# Patient Record
Sex: MALE | Race: BLACK OR AFRICAN AMERICAN
[De-identification: names, ages, dates, MRNs, and addresses within clinical notes are randomized per-mention and may not be internally consistent; named-entity substitution may affect disease eponyms.]

---

## 2019-09-14 ENCOUNTER — HOSPITAL ENCOUNTER (EMERGENCY)
Dept: HOSPITAL 94 - ER | Age: 29
Discharge: HOME | End: 2019-09-14
Payer: MEDICAID

## 2019-09-14 VITALS — WEIGHT: 240.3 LBS | HEIGHT: 73 IN | BODY MASS INDEX: 31.85 KG/M2

## 2019-09-14 VITALS — SYSTOLIC BLOOD PRESSURE: 181 MMHG | DIASTOLIC BLOOD PRESSURE: 126 MMHG

## 2019-09-14 DIAGNOSIS — B07.9: Primary | ICD-10-CM

## 2019-09-14 DIAGNOSIS — Z79.899: ICD-10-CM

## 2019-09-14 DIAGNOSIS — I10: ICD-10-CM

## 2019-09-14 PROCEDURE — 99284 EMERGENCY DEPT VISIT MOD MDM: CPT

## 2019-09-14 PROCEDURE — 11440 EXC FACE-MM B9+MARG 0.5 CM/<: CPT

## 2020-05-03 ENCOUNTER — HOSPITAL ENCOUNTER (EMERGENCY)
Dept: HOSPITAL 94 - ER | Age: 30
Discharge: HOME | End: 2020-05-03
Payer: MEDICAID

## 2020-05-03 VITALS — DIASTOLIC BLOOD PRESSURE: 128 MMHG | SYSTOLIC BLOOD PRESSURE: 175 MMHG

## 2020-05-03 VITALS — BODY MASS INDEX: 31.87 KG/M2 | WEIGHT: 240.5 LBS | HEIGHT: 73 IN

## 2020-05-03 DIAGNOSIS — F17.210: ICD-10-CM

## 2020-05-03 DIAGNOSIS — I10: ICD-10-CM

## 2020-05-03 DIAGNOSIS — Z79.899: ICD-10-CM

## 2020-05-03 DIAGNOSIS — J45.909: Primary | ICD-10-CM

## 2020-05-03 PROCEDURE — 94640 AIRWAY INHALATION TREATMENT: CPT

## 2020-05-03 PROCEDURE — 99406 BEHAV CHNG SMOKING 3-10 MIN: CPT

## 2020-05-03 PROCEDURE — 94760 N-INVAS EAR/PLS OXIMETRY 1: CPT

## 2020-05-03 PROCEDURE — 99283 EMERGENCY DEPT VISIT LOW MDM: CPT

## 2020-05-20 ENCOUNTER — HOSPITAL ENCOUNTER (INPATIENT)
Dept: HOSPITAL 94 - ER | Age: 30
LOS: 3 days | Discharge: HOME | DRG: 194 | End: 2020-05-23
Attending: INTERNAL MEDICINE | Admitting: SPECIALIST
Payer: MEDICAID

## 2020-05-20 VITALS — HEIGHT: 73 IN | WEIGHT: 200.42 LBS | BODY MASS INDEX: 26.56 KG/M2

## 2020-05-20 DIAGNOSIS — I11.0: Primary | ICD-10-CM
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
## 2020-05-20 DIAGNOSIS — Z82.49: ICD-10-CM

## 2020-05-20 DIAGNOSIS — I42.9: ICD-10-CM

## 2020-05-20 DIAGNOSIS — F14.90: ICD-10-CM

## 2020-05-20 DIAGNOSIS — N17.9: ICD-10-CM

## 2020-05-20 DIAGNOSIS — I42.7: ICD-10-CM

## 2020-05-20 DIAGNOSIS — I16.1: ICD-10-CM

## 2020-05-20 DIAGNOSIS — T50.905A: ICD-10-CM

## 2020-05-20 DIAGNOSIS — F17.200: ICD-10-CM

## 2020-05-20 DIAGNOSIS — I50.23: ICD-10-CM

## 2020-05-20 DIAGNOSIS — J45.909: ICD-10-CM

## 2020-05-20 DIAGNOSIS — Z03.818: ICD-10-CM

## 2020-05-20 LAB
BASOPHILS # BLD AUTO: 0 X10'3 (ref 0–0.2)
BASOPHILS NFR BLD AUTO: 0.4 % (ref 0–1)
D DIMER PPP FEU-MCNC: 0.99 MG/L FEU (ref 0–0.5)
EOSINOPHIL # BLD AUTO: 0 X10'3 (ref 0–0.9)
EOSINOPHIL NFR BLD AUTO: 0.4 % (ref 0–6)
ERYTHROCYTE [DISTWIDTH] IN BLOOD BY AUTOMATED COUNT: 17.3 % (ref 11.5–14.5)
HCT VFR BLD AUTO: 39.9 % (ref 42–52)
HGB BLD-MCNC: 12.8 G/DL (ref 14–17.9)
LYMPHOCYTES # BLD AUTO: 1 X10'3 (ref 1.1–4.8)
LYMPHOCYTES NFR BLD AUTO: 7.6 % (ref 21–51)
MCH RBC QN AUTO: 28.9 PG (ref 27–31)
MCHC RBC AUTO-ENTMCNC: 32 G/DL (ref 33–36.5)
MCV RBC AUTO: 90.4 FL (ref 78–98)
MONOCYTES # BLD AUTO: 1.1 X10'3 (ref 0–0.9)
MONOCYTES NFR BLD AUTO: 8.5 % (ref 2–12)
NEUTROPHILS # BLD AUTO: 10.4 X10'3 (ref 1.8–7.7)
NEUTROPHILS NFR BLD AUTO: 83.1 % (ref 42–75)
PLATELET # BLD AUTO: 291 X10'3 (ref 140–440)
PMV BLD AUTO: 8.2 FL (ref 7.4–10.4)
RBC # BLD AUTO: 4.41 X10'6 (ref 4.7–6.1)
WBC # BLD AUTO: 12.5 X10'3 (ref 4.5–11)

## 2020-05-20 PROCEDURE — 96361 HYDRATE IV INFUSION ADD-ON: CPT

## 2020-05-20 PROCEDURE — 36415 COLL VENOUS BLD VENIPUNCTURE: CPT

## 2020-05-20 PROCEDURE — 87635 SARS-COV-2 COVID-19 AMP PRB: CPT

## 2020-05-20 PROCEDURE — 84484 ASSAY OF TROPONIN QUANT: CPT

## 2020-05-20 PROCEDURE — 71275 CT ANGIOGRAPHY CHEST: CPT

## 2020-05-20 PROCEDURE — 87040 BLOOD CULTURE FOR BACTERIA: CPT

## 2020-05-20 PROCEDURE — 83880 ASSAY OF NATRIURETIC PEPTIDE: CPT

## 2020-05-20 PROCEDURE — 87081 CULTURE SCREEN ONLY: CPT

## 2020-05-20 PROCEDURE — 84145 PROCALCITONIN (PCT): CPT

## 2020-05-20 PROCEDURE — 93005 ELECTROCARDIOGRAM TRACING: CPT

## 2020-05-20 PROCEDURE — 71045 X-RAY EXAM CHEST 1 VIEW: CPT

## 2020-05-20 PROCEDURE — 96365 THER/PROPH/DIAG IV INF INIT: CPT

## 2020-05-20 PROCEDURE — 83605 ASSAY OF LACTIC ACID: CPT

## 2020-05-20 PROCEDURE — 85379 FIBRIN DEGRADATION QUANT: CPT

## 2020-05-20 PROCEDURE — 80305 DRUG TEST PRSMV DIR OPT OBS: CPT

## 2020-05-20 PROCEDURE — 93306 TTE W/DOPPLER COMPLETE: CPT

## 2020-05-20 PROCEDURE — 96376 TX/PRO/DX INJ SAME DRUG ADON: CPT

## 2020-05-20 PROCEDURE — 96375 TX/PRO/DX INJ NEW DRUG ADDON: CPT

## 2020-05-20 PROCEDURE — 99285 EMERGENCY DEPT VISIT HI MDM: CPT

## 2020-05-20 PROCEDURE — 82948 REAGENT STRIP/BLOOD GLUCOSE: CPT

## 2020-05-20 PROCEDURE — 80053 COMPREHEN METABOLIC PANEL: CPT

## 2020-05-20 PROCEDURE — 85025 COMPLETE CBC W/AUTO DIFF WBC: CPT

## 2020-05-21 VITALS — DIASTOLIC BLOOD PRESSURE: 96 MMHG | SYSTOLIC BLOOD PRESSURE: 153 MMHG

## 2020-05-21 VITALS — DIASTOLIC BLOOD PRESSURE: 109 MMHG | SYSTOLIC BLOOD PRESSURE: 159 MMHG

## 2020-05-21 VITALS — SYSTOLIC BLOOD PRESSURE: 149 MMHG | DIASTOLIC BLOOD PRESSURE: 99 MMHG

## 2020-05-21 VITALS — SYSTOLIC BLOOD PRESSURE: 131 MMHG | DIASTOLIC BLOOD PRESSURE: 95 MMHG

## 2020-05-21 VITALS — DIASTOLIC BLOOD PRESSURE: 101 MMHG | SYSTOLIC BLOOD PRESSURE: 148 MMHG

## 2020-05-21 LAB
ALBUMIN SERPL BCP-MCNC: 2.5 G/DL (ref 3.4–5)
ALBUMIN/GLOB SERPL: 0.7 {RATIO} (ref 1.1–1.5)
ALP SERPL-CCNC: 74 IU/L (ref 46–116)
ALT SERPL W P-5'-P-CCNC: 63 U/L (ref 12–78)
AMPHETAMINES UR QL SCN: NEGATIVE
ANION GAP SERPL CALCULATED.3IONS-SCNC: 12 MMOL/L (ref 8–16)
AST SERPL W P-5'-P-CCNC: 47 U/L (ref 10–37)
BARBITURATES UR QL SCN: NEGATIVE
BENZODIAZ UR QL SCN: NEGATIVE
BILIRUB SERPL-MCNC: 1.1 MG/DL (ref 0.1–1)
BUN SERPL-MCNC: 20 MG/DL (ref 7–18)
BUN/CREAT SERPL: 12.3 (ref 5.4–32)
BZE UR QL SCN: POSITIVE
CALCIUM SERPL-MCNC: 8.7 MG/DL (ref 8.5–10.1)
CANNABINOIDS UR QL SCN: NEGATIVE
CHLORIDE SERPL-SCNC: 104 MMOL/L (ref 99–107)
CO2 SERPL-SCNC: 23.2 MMOL/L (ref 24–32)
CREAT SERPL-MCNC: 1.63 MG/DL (ref 0.6–1.1)
GFR SERPL CREATININE-BSD FRML MDRD: 60 ML/MIN
GLUCOSE SERPL-MCNC: 107 MG/DL (ref 70–104)
METHADONE UR QL SCN: NEGATIVE
OPIATES UR QL SCN: NEGATIVE
PCP UR QL SCN: NEGATIVE
POTASSIUM SERPL-SCNC: 4.2 MMOL/L (ref 3.5–5.1)
PROT SERPL-MCNC: 6 G/DL (ref 6.4–8.2)
SODIUM SERPL-SCNC: 139 MMOL/L (ref 135–145)

## 2020-05-21 PROCEDURE — B32T1ZZ COMPUTERIZED TOMOGRAPHY (CT SCAN) OF LEFT PULMONARY ARTERY USING LOW OSMOLAR CONTRAST: ICD-10-PCS

## 2020-05-21 PROCEDURE — B3201ZZ COMPUTERIZED TOMOGRAPHY (CT SCAN) OF THORACIC AORTA USING LOW OSMOLAR CONTRAST: ICD-10-PCS

## 2020-05-21 PROCEDURE — B32S1ZZ COMPUTERIZED TOMOGRAPHY (CT SCAN) OF RIGHT PULMONARY ARTERY USING LOW OSMOLAR CONTRAST: ICD-10-PCS

## 2020-05-21 RX ADMIN — CEFTRIAXONE SCH MLS/HR: 1 INJECTION, SOLUTION INTRAVENOUS at 09:10

## 2020-05-21 RX ADMIN — HEPARIN SODIUM SCH UNIT: 5000 INJECTION, SOLUTION INTRAVENOUS; SUBCUTANEOUS at 09:12

## 2020-05-21 RX ADMIN — HYDROCODONE BITARTRATE AND ACETAMINOPHEN PRN TAB: 5; 325 TABLET ORAL at 23:23

## 2020-05-21 RX ADMIN — HEPARIN SODIUM SCH UNIT: 5000 INJECTION, SOLUTION INTRAVENOUS; SUBCUTANEOUS at 19:49

## 2020-05-21 RX ADMIN — Medication SCH MMU: at 19:49

## 2020-05-21 NOTE — NUR
Orientee documentation:

I have reviewed and agree with all interventions, assessments performed and documented by 
OSCAR Childress.

## 2020-05-21 NOTE — NUR
Patient in room PCU 3026. I have received report from Fozia PECK RN   and had the opportunity 
to ask questions and assume patient care.

## 2020-05-21 NOTE — NUR
Orientee Medication Administration:

For this medication-pass time frame, all medication were reviewed, dispensed, administered 
and documented per hospital policy by OSCAR Childress.

## 2020-05-21 NOTE — NUR
Problems reprioritized. Patient report given, questions answered & plan of care reviewed 
with OSCAR Suarez. Patient watching tv, eating dinner independently. All needs met at this 
time.

## 2020-05-21 NOTE — NUR
Patient arrived from ER via wheelchair Alert and orientated, call light is within reach. 
Patient is up ad virgen, no signs of distress. Patients vitals temp 98, bp 148/107, O2 room air 
96%, , RR 18, pain 0/10. MRSA swab sample collected. Two RN skin check completed with 
no wounds noted.

## 2020-05-22 VITALS — DIASTOLIC BLOOD PRESSURE: 97 MMHG | SYSTOLIC BLOOD PRESSURE: 144 MMHG

## 2020-05-22 VITALS — DIASTOLIC BLOOD PRESSURE: 101 MMHG | SYSTOLIC BLOOD PRESSURE: 124 MMHG

## 2020-05-22 VITALS — SYSTOLIC BLOOD PRESSURE: 140 MMHG | DIASTOLIC BLOOD PRESSURE: 82 MMHG

## 2020-05-22 VITALS — SYSTOLIC BLOOD PRESSURE: 162 MMHG | DIASTOLIC BLOOD PRESSURE: 112 MMHG

## 2020-05-22 VITALS — SYSTOLIC BLOOD PRESSURE: 153 MMHG | DIASTOLIC BLOOD PRESSURE: 106 MMHG

## 2020-05-22 VITALS — SYSTOLIC BLOOD PRESSURE: 144 MMHG | DIASTOLIC BLOOD PRESSURE: 111 MMHG

## 2020-05-22 LAB
ALBUMIN SERPL BCP-MCNC: 2.2 G/DL (ref 3.4–5)
ALBUMIN/GLOB SERPL: 0.6 {RATIO} (ref 1.1–1.5)
ALP SERPL-CCNC: 71 IU/L (ref 46–116)
ALT SERPL W P-5'-P-CCNC: 67 U/L (ref 12–78)
ANION GAP SERPL CALCULATED.3IONS-SCNC: 10 MMOL/L (ref 8–16)
AST SERPL W P-5'-P-CCNC: 45 U/L (ref 10–37)
BASOPHILS # BLD AUTO: 0.1 X10'3 (ref 0–0.2)
BASOPHILS NFR BLD AUTO: 0.8 % (ref 0–1)
BILIRUB SERPL-MCNC: 0.9 MG/DL (ref 0.1–1)
BUN SERPL-MCNC: 24 MG/DL (ref 7–18)
BUN/CREAT SERPL: 16.8 (ref 5.4–32)
CALCIUM SERPL-MCNC: 8 MG/DL (ref 8.5–10.1)
CHLORIDE SERPL-SCNC: 104 MMOL/L (ref 99–107)
CO2 SERPL-SCNC: 22.3 MMOL/L (ref 24–32)
CREAT SERPL-MCNC: 1.43 MG/DL (ref 0.6–1.1)
EOSINOPHIL # BLD AUTO: 0.2 X10'3 (ref 0–0.9)
EOSINOPHIL NFR BLD AUTO: 1.6 % (ref 0–6)
ERYTHROCYTE [DISTWIDTH] IN BLOOD BY AUTOMATED COUNT: 17.4 % (ref 11.5–14.5)
GFR SERPL CREATININE-BSD FRML MDRD: 69 ML/MIN
GLUCOSE SERPL-MCNC: 98 MG/DL (ref 70–104)
HCT VFR BLD AUTO: 40.8 % (ref 42–52)
HGB BLD-MCNC: 13.2 G/DL (ref 14–17.9)
LYMPHOCYTES # BLD AUTO: 2 X10'3 (ref 1.1–4.8)
LYMPHOCYTES NFR BLD AUTO: 20.5 % (ref 21–51)
MCH RBC QN AUTO: 29.6 PG (ref 27–31)
MCHC RBC AUTO-ENTMCNC: 32.4 G/DL (ref 33–36.5)
MCV RBC AUTO: 91.3 FL (ref 78–98)
MONOCYTES # BLD AUTO: 1.1 X10'3 (ref 0–0.9)
MONOCYTES NFR BLD AUTO: 11.4 % (ref 2–12)
NEUTROPHILS # BLD AUTO: 6.3 X10'3 (ref 1.8–7.7)
NEUTROPHILS NFR BLD AUTO: 65.7 % (ref 42–75)
PLATELET # BLD AUTO: 290 X10'3 (ref 140–440)
PMV BLD AUTO: 8.9 FL (ref 7.4–10.4)
POTASSIUM SERPL-SCNC: 4.2 MMOL/L (ref 3.5–5.1)
PROT SERPL-MCNC: 5.8 G/DL (ref 6.4–8.2)
RBC # BLD AUTO: 4.47 X10'6 (ref 4.7–6.1)
SODIUM SERPL-SCNC: 136 MMOL/L (ref 135–145)
TROPONIN I SERPL-MCNC: 0.04 NG/ML (ref 0–0.05)
WBC # BLD AUTO: 9.6 X10'3 (ref 4.5–11)

## 2020-05-22 RX ADMIN — Medication SCH MMU: at 20:56

## 2020-05-22 RX ADMIN — HYDROCODONE BITARTRATE AND ACETAMINOPHEN PRN TAB: 5; 325 TABLET ORAL at 20:55

## 2020-05-22 RX ADMIN — HYDROCODONE BITARTRATE AND ACETAMINOPHEN PRN TAB: 5; 325 TABLET ORAL at 08:38

## 2020-05-22 RX ADMIN — CEFTRIAXONE SCH MLS/HR: 1 INJECTION, SOLUTION INTRAVENOUS at 08:38

## 2020-05-22 RX ADMIN — Medication SCH MMU: at 08:38

## 2020-05-22 RX ADMIN — HEPARIN SODIUM SCH UNIT: 5000 INJECTION, SOLUTION INTRAVENOUS; SUBCUTANEOUS at 08:37

## 2020-05-22 RX ADMIN — HEPARIN SODIUM SCH UNIT: 5000 INJECTION, SOLUTION INTRAVENOUS; SUBCUTANEOUS at 20:51

## 2020-05-22 NOTE — NUR
Merle Hernandez. [id number]. Patient would like to be discharged but wants to get meds. He says 
there is issues at home he needs to handle with his wife and landlord. Zack saw him and 
agreed to medical management. Please advise. Clemente [phone]

## 2020-05-22 NOTE — NUR
Problems reprioritized. Patient report given, questions answered & plan of care reviewed 
with Cristina MOORE.

## 2020-05-22 NOTE — NUR
Paged Dr. Alcala. Merle Hernandez. [id number]. After speaking more about the importance of him 
getting his prescriptions, he is reluctantly willing to stay. TY. Clemente [phone]

## 2020-05-22 NOTE — NUR
Paged Dr. Alcala. Merle Hernandez. [id number]. Patient would like to be discharged but wants to 
get meds. He says there is issues at home he needs to handle with his wife and landlord. 
Zack saw him and agreed to medical management. Please advise. Clemente [phone]

## 2020-05-22 NOTE — NUR
Patient in room PCU 3026. I have received report from  MEJIA MOORE  and had the opportunity 
to ask questions and assume patient care.

## 2020-05-22 NOTE — NUR
Patient in room PCU 3026. I have received report from Daniela MOORE and had the opportunity to 
ask questions and assume patient care.

## 2020-05-22 NOTE — NUR
Problems reprioritized. Patient report given, questions answered & plan of care reviewed 
with Clemente MOORE.

## 2020-05-23 VITALS — DIASTOLIC BLOOD PRESSURE: 109 MMHG | SYSTOLIC BLOOD PRESSURE: 144 MMHG

## 2020-05-23 VITALS — DIASTOLIC BLOOD PRESSURE: 81 MMHG | SYSTOLIC BLOOD PRESSURE: 121 MMHG

## 2020-05-23 VITALS — DIASTOLIC BLOOD PRESSURE: 115 MMHG | SYSTOLIC BLOOD PRESSURE: 145 MMHG

## 2020-05-23 LAB
ALBUMIN SERPL BCP-MCNC: 2.1 G/DL (ref 3.4–5)
ALBUMIN/GLOB SERPL: 0.6 {RATIO} (ref 1.1–1.5)
ALP SERPL-CCNC: 64 IU/L (ref 46–116)
ALT SERPL W P-5'-P-CCNC: 64 U/L (ref 12–78)
ANION GAP SERPL CALCULATED.3IONS-SCNC: 5 MMOL/L (ref 8–16)
AST SERPL W P-5'-P-CCNC: 38 U/L (ref 10–37)
BASOPHILS # BLD AUTO: 0.1 X10'3 (ref 0–0.2)
BASOPHILS NFR BLD AUTO: 1.1 % (ref 0–1)
BILIRUB SERPL-MCNC: 0.5 MG/DL (ref 0.1–1)
BUN SERPL-MCNC: 20 MG/DL (ref 7–18)
BUN/CREAT SERPL: 12.7 (ref 5.4–32)
CALCIUM SERPL-MCNC: 8.3 MG/DL (ref 8.5–10.1)
CHLORIDE SERPL-SCNC: 104 MMOL/L (ref 99–107)
CO2 SERPL-SCNC: 27.6 MMOL/L (ref 24–32)
CREAT SERPL-MCNC: 1.58 MG/DL (ref 0.6–1.1)
EOSINOPHIL # BLD AUTO: 0.1 X10'3 (ref 0–0.9)
EOSINOPHIL NFR BLD AUTO: 1.4 % (ref 0–6)
ERYTHROCYTE [DISTWIDTH] IN BLOOD BY AUTOMATED COUNT: 17.2 % (ref 11.5–14.5)
GFR SERPL CREATININE-BSD FRML MDRD: 62 ML/MIN
GLUCOSE SERPL-MCNC: 101 MG/DL (ref 70–104)
HCT VFR BLD AUTO: 38.1 % (ref 42–52)
HGB BLD-MCNC: 12.2 G/DL (ref 14–17.9)
LYMPHOCYTES # BLD AUTO: 1.6 X10'3 (ref 1.1–4.8)
LYMPHOCYTES NFR BLD AUTO: 20.9 % (ref 21–51)
MCH RBC QN AUTO: 29.4 PG (ref 27–31)
MCHC RBC AUTO-ENTMCNC: 32.2 G/DL (ref 33–36.5)
MCV RBC AUTO: 91.5 FL (ref 78–98)
MONOCYTES # BLD AUTO: 0.9 X10'3 (ref 0–0.9)
MONOCYTES NFR BLD AUTO: 11.9 % (ref 2–12)
NEUTROPHILS # BLD AUTO: 5 X10'3 (ref 1.8–7.7)
NEUTROPHILS NFR BLD AUTO: 64.7 % (ref 42–75)
PLATELET # BLD AUTO: 309 X10'3 (ref 140–440)
PMV BLD AUTO: 8.9 FL (ref 7.4–10.4)
POTASSIUM SERPL-SCNC: 4.1 MMOL/L (ref 3.5–5.1)
PROT SERPL-MCNC: 5.6 G/DL (ref 6.4–8.2)
RBC # BLD AUTO: 4.16 X10'6 (ref 4.7–6.1)
SODIUM SERPL-SCNC: 137 MMOL/L (ref 135–145)
WBC # BLD AUTO: 7.7 X10'3 (ref 4.5–11)

## 2020-05-23 RX ADMIN — Medication SCH MMU: at 07:50

## 2020-05-23 RX ADMIN — HEPARIN SODIUM SCH UNIT: 5000 INJECTION, SOLUTION INTRAVENOUS; SUBCUTANEOUS at 07:52

## 2020-05-23 NOTE — NUR
Patient in room PCU 3026. I have received report from Tal MOORE and had the opportunity to 
ask questions and assume patient care.

## 2020-05-23 NOTE — NUR
Patient safe for discharge per MD orders, discharge instructions reviewed, prescriptions 
called into [facility] pharmacy, belongings collected and sent with patient, walked to front 
door, picked up by family. Patient does not have PCP or Cardiologist. Will find PCP in next 
week and get referral to Cardiologist.

## 2020-05-23 NOTE — NUR
Problems reprioritized. Patient report given, questions answered & plan of care reviewed 
with MEJIA MOORE.

## 2020-07-04 ENCOUNTER — HOSPITAL ENCOUNTER (EMERGENCY)
Dept: HOSPITAL 94 - ER | Age: 30
Discharge: HOME | End: 2020-07-04
Payer: MEDICAID

## 2020-07-04 VITALS — BODY MASS INDEX: 34.53 KG/M2 | WEIGHT: 260.54 LBS | HEIGHT: 73 IN

## 2020-07-04 VITALS — DIASTOLIC BLOOD PRESSURE: 147 MMHG

## 2020-07-04 VITALS — SYSTOLIC BLOOD PRESSURE: 184 MMHG

## 2020-07-04 DIAGNOSIS — J45.909: ICD-10-CM

## 2020-07-04 DIAGNOSIS — I11.0: ICD-10-CM

## 2020-07-04 DIAGNOSIS — I42.9: Primary | ICD-10-CM

## 2020-07-04 DIAGNOSIS — I50.9: ICD-10-CM

## 2020-07-04 DIAGNOSIS — R60.9: ICD-10-CM

## 2020-07-04 DIAGNOSIS — Z79.899: ICD-10-CM

## 2020-07-04 DIAGNOSIS — Z72.89: ICD-10-CM

## 2020-07-04 LAB
ALBUMIN SERPL BCP-MCNC: 2.6 G/DL (ref 3.4–5)
ALBUMIN/GLOB SERPL: 0.7 {RATIO} (ref 1.1–1.5)
ALP SERPL-CCNC: 72 IU/L (ref 46–116)
ALT SERPL W P-5'-P-CCNC: 38 U/L (ref 12–78)
ANION GAP SERPL CALCULATED.3IONS-SCNC: 8 MMOL/L (ref 8–16)
AST SERPL W P-5'-P-CCNC: 25 U/L (ref 10–37)
BASOPHILS # BLD AUTO: 0 X10'3 (ref 0–0.2)
BASOPHILS NFR BLD AUTO: 0.7 % (ref 0–1)
BILIRUB SERPL-MCNC: 1.3 MG/DL (ref 0.1–1)
BUN SERPL-MCNC: 12 MG/DL (ref 7–18)
BUN/CREAT SERPL: 8.2 (ref 5.4–32)
CALCIUM SERPL-MCNC: 8.3 MG/DL (ref 8.5–10.1)
CHLORIDE SERPL-SCNC: 104 MMOL/L (ref 99–107)
CO2 SERPL-SCNC: 27.3 MMOL/L (ref 24–32)
CREAT SERPL-MCNC: 1.46 MG/DL (ref 0.6–1.1)
EOSINOPHIL # BLD AUTO: 0.1 X10'3 (ref 0–0.9)
EOSINOPHIL NFR BLD AUTO: 1.3 % (ref 0–6)
ERYTHROCYTE [DISTWIDTH] IN BLOOD BY AUTOMATED COUNT: 17.9 % (ref 11.5–14.5)
GFR SERPL CREATININE-BSD FRML MDRD: 68 ML/MIN
GLUCOSE SERPL-MCNC: 108 MG/DL (ref 70–104)
HCT VFR BLD AUTO: 37.7 % (ref 42–52)
HGB BLD-MCNC: 11.9 G/DL (ref 14–17.9)
LYMPHOCYTES # BLD AUTO: 1.1 X10'3 (ref 1.1–4.8)
LYMPHOCYTES NFR BLD AUTO: 16.1 % (ref 21–51)
MCH RBC QN AUTO: 27.3 PG (ref 27–31)
MCHC RBC AUTO-ENTMCNC: 31.4 G/DL (ref 33–36.5)
MCV RBC AUTO: 86.7 FL (ref 78–98)
MONOCYTES # BLD AUTO: 0.7 X10'3 (ref 0–0.9)
MONOCYTES NFR BLD AUTO: 10.6 % (ref 2–12)
NEUTROPHILS # BLD AUTO: 5 X10'3 (ref 1.8–7.7)
NEUTROPHILS NFR BLD AUTO: 71.3 % (ref 42–75)
PLATELET # BLD AUTO: 292 X10'3 (ref 140–440)
PMV BLD AUTO: 8 FL (ref 7.4–10.4)
POTASSIUM SERPL-SCNC: 4.1 MMOL/L (ref 3.5–5.1)
PROT SERPL-MCNC: 6.6 G/DL (ref 6.4–8.2)
RBC # BLD AUTO: 4.35 X10'6 (ref 4.7–6.1)
SODIUM SERPL-SCNC: 139 MMOL/L (ref 135–145)
WBC # BLD AUTO: 7 X10'3 (ref 4.5–11)

## 2020-07-04 PROCEDURE — 71045 X-RAY EXAM CHEST 1 VIEW: CPT

## 2020-07-04 PROCEDURE — 96374 THER/PROPH/DIAG INJ IV PUSH: CPT

## 2020-07-04 PROCEDURE — 99285 EMERGENCY DEPT VISIT HI MDM: CPT

## 2020-07-04 PROCEDURE — 84484 ASSAY OF TROPONIN QUANT: CPT

## 2020-07-04 PROCEDURE — 36415 COLL VENOUS BLD VENIPUNCTURE: CPT

## 2020-07-04 PROCEDURE — 80053 COMPREHEN METABOLIC PANEL: CPT

## 2020-07-04 PROCEDURE — 85025 COMPLETE CBC W/AUTO DIFF WBC: CPT

## 2020-07-04 PROCEDURE — 93005 ELECTROCARDIOGRAM TRACING: CPT

## 2020-07-04 NOTE — NUR
discussed covid history with pt. pt reports that both him and his wife were 
tested two weeks ago, and both were negative. pt denies any recent contact with 
a covid positive person. denies any hx of fever.

## 2020-07-04 NOTE — NUR
PT WIFE CALLED TO CLARIFY THAT SHE IS NEG FOR COVID. SHE STATES SHE GETS TESTED 
EVERY WEEK FOR HER WORK. SHE ALSO WANTED THE MD TO KNOW PTS PMD HAS BEEN TRYING 
TO GET HIS BP, HR, AND EDEMA UNDER CONTROL WITH MED CHANGES OVER THE PAST 1.5 
MONTHS AND PTS CONTINUES TO HAVE SWELLING AND SOB. PTS WIFE CAN BE REACHED AT 
[phone]. DR SHRESTHA NOTIFED
